# Patient Record
Sex: FEMALE | Race: WHITE | NOT HISPANIC OR LATINO
[De-identification: names, ages, dates, MRNs, and addresses within clinical notes are randomized per-mention and may not be internally consistent; named-entity substitution may affect disease eponyms.]

---

## 2023-01-05 PROBLEM — Z00.00 ENCOUNTER FOR PREVENTIVE HEALTH EXAMINATION: Status: ACTIVE | Noted: 2023-01-05

## 2023-01-17 ENCOUNTER — APPOINTMENT (OUTPATIENT)
Dept: ORTHOPEDIC SURGERY | Facility: CLINIC | Age: 58
End: 2023-01-17
Payer: COMMERCIAL

## 2023-01-17 VITALS — WEIGHT: 144 LBS | BODY MASS INDEX: 24.29 KG/M2 | HEIGHT: 64.5 IN

## 2023-01-17 DIAGNOSIS — Z78.9 OTHER SPECIFIED HEALTH STATUS: ICD-10-CM

## 2023-01-17 DIAGNOSIS — M50.90 CERVICAL DISC DISORDER, UNSPECIFIED, UNSPECIFIED CERVICAL REGION: ICD-10-CM

## 2023-01-17 DIAGNOSIS — M54.12 RADICULOPATHY, CERVICAL REGION: ICD-10-CM

## 2023-01-17 PROCEDURE — 72040 X-RAY EXAM NECK SPINE 2-3 VW: CPT

## 2023-01-17 PROCEDURE — 99204 OFFICE O/P NEW MOD 45 MIN: CPT

## 2023-01-17 RX ORDER — CYCLOBENZAPRINE HYDROCHLORIDE 5 MG/1
5 TABLET, FILM COATED ORAL
Qty: 30 | Refills: 1 | Status: ACTIVE | COMMUNITY
Start: 2023-01-17 | End: 1900-01-01

## 2023-01-17 RX ORDER — METHYLPREDNISOLONE 4 MG/1
4 TABLET ORAL
Qty: 1 | Refills: 0 | Status: ACTIVE | COMMUNITY
Start: 2023-01-17 | End: 1900-01-01

## 2023-01-17 NOTE — ASSESSMENT
[FreeTextEntry1] : Signs of cervical radiculopathy, crevical DDD.\par MDP.\par Flexeril.\par Heat.\par Rest.\par PT. \par RTO 6 weeks with pain management.\par

## 2023-01-17 NOTE — HISTORY OF PRESENT ILLNESS
[Gradual] : gradual [6] : 6 [Dull/Aching] : dull/aching [Localized] : localized [Tightness] : tightness [Nothing helps with pain getting better] : Nothing helps with pain getting better [de-identified] : 1/17/22: 58 yo RHD female with left shoulder pain since Nov 2022. Denies specific injury. There is pain deep in her shoulder. There is now pain into her neck with numbness down her arm. She has cervical HNP treated with TPIs. She states pain and been getting progressively worse past few months. She tried Aleve, motrin and Salonpas.  [] : no [FreeTextEntry1] : lt shoulder [FreeTextEntry3] : November 2022 [FreeTextEntry5] : Ongoing lt shoulder pain. No injury. Patient reports it has been getting worse and pain is going into neck. Numbness and tingling going down the lt arm. Patient has a history of cervical herniated disc [FreeTextEntry7] : lt arm, lt side of upper chest [de-identified] : Dec 2022 [de-identified] : PCP [de-identified] : xray ZP

## 2023-01-17 NOTE — PHYSICAL EXAM
[5 ___] : forward flexion 5[unfilled]/5 [5___] : internal rotation 5[unfilled]/5 [] : no ecchymosis [FreeTextEntry9] :  b/l\par ER 58 b/L

## 2023-01-18 ENCOUNTER — APPOINTMENT (OUTPATIENT)
Dept: ORTHOPEDIC SURGERY | Facility: CLINIC | Age: 58
End: 2023-01-18

## 2023-03-02 ENCOUNTER — APPOINTMENT (OUTPATIENT)
Dept: PAIN MANAGEMENT | Facility: CLINIC | Age: 58
End: 2023-03-02

## 2025-07-02 ENCOUNTER — RX ONLY (RX ONLY)
Age: 60
End: 2025-07-02

## 2025-07-02 ENCOUNTER — OFFICE (OUTPATIENT)
Dept: URBAN - METROPOLITAN AREA CLINIC 12 | Facility: CLINIC | Age: 60
Setting detail: OPHTHALMOLOGY
End: 2025-07-02
Payer: COMMERCIAL

## 2025-07-02 VITALS — WEIGHT: 135 LBS | HEIGHT: 64 IN

## 2025-07-02 DIAGNOSIS — B00.52: ICD-10-CM

## 2025-07-02 PROCEDURE — 92002 INTRM OPH EXAM NEW PATIENT: CPT | Performed by: OPTOMETRIST

## 2025-07-02 ASSESSMENT — CONFRONTATIONAL VISUAL FIELD TEST (CVF)
OD_FINDINGS: FULL
OS_FINDINGS: FULL

## 2025-07-02 ASSESSMENT — KERATOMETRY
OD_K2POWER_DIOPTERS: 46.25
OS_AXISANGLE_DEGREES: 102
OD_AXISANGLE_DEGREES: 097
OS_K2POWER_DIOPTERS: 43.50
OD_K1POWER_DIOPTERS: 43.75
OS_K1POWER_DIOPTERS: 43.00

## 2025-07-02 ASSESSMENT — REFRACTION_AUTOREFRACTION
OD_SPHERE: +1.25
OD_CYLINDER: -4.75
OS_AXIS: 176
OS_CYLINDER: -0.50
OD_AXIS: 177
OS_SPHERE: +1.25

## 2025-07-02 ASSESSMENT — TONOMETRY
OD_IOP_MMHG: 11
OS_IOP_MMHG: 11

## 2025-07-02 ASSESSMENT — VISUAL ACUITY
OS_BCVA: 20/50
OD_BCVA: 20/40-3

## 2025-07-07 ENCOUNTER — RX ONLY (RX ONLY)
Age: 60
End: 2025-07-07

## 2025-07-07 ENCOUNTER — OFFICE (OUTPATIENT)
Dept: URBAN - METROPOLITAN AREA CLINIC 12 | Facility: CLINIC | Age: 60
Setting detail: OPHTHALMOLOGY
End: 2025-07-07
Payer: COMMERCIAL

## 2025-07-07 DIAGNOSIS — H16.041: ICD-10-CM

## 2025-07-07 DIAGNOSIS — B00.52: ICD-10-CM

## 2025-07-07 PROCEDURE — 92012 INTRM OPH EXAM EST PATIENT: CPT | Performed by: OPTOMETRIST

## 2025-07-07 ASSESSMENT — KERATOMETRY
OS_K2POWER_DIOPTERS: 44.00
OS_K1POWER_DIOPTERS: 43.25
OD_K2POWER_DIOPTERS: 45.75
OS_AXISANGLE_DEGREES: 086
OD_K1POWER_DIOPTERS: 43.50
OD_AXISANGLE_DEGREES: 096

## 2025-07-07 ASSESSMENT — TONOMETRY
OS_IOP_MMHG: 12
OD_IOP_MMHG: 11

## 2025-07-07 ASSESSMENT — REFRACTION_AUTOREFRACTION
OS_AXIS: 107
OS_SPHERE: +1.25
OD_SPHERE: +1.50
OD_CYLINDER: -1.75
OS_CYLINDER: -0.50
OD_AXIS: 007

## 2025-07-07 ASSESSMENT — CONFRONTATIONAL VISUAL FIELD TEST (CVF)
OD_FINDINGS: FULL
OS_FINDINGS: FULL

## 2025-07-07 ASSESSMENT — VISUAL ACUITY
OS_BCVA: 20/40-2
OD_BCVA: 20/40-2